# Patient Record
Sex: MALE | Race: WHITE | URBAN - METROPOLITAN AREA
[De-identification: names, ages, dates, MRNs, and addresses within clinical notes are randomized per-mention and may not be internally consistent; named-entity substitution may affect disease eponyms.]

---

## 2017-10-09 ENCOUNTER — APPOINTMENT (RX ONLY)
Dept: URBAN - METROPOLITAN AREA SURGERY CENTER 8 | Facility: SURGERY CENTER | Age: 42
Setting detail: DERMATOLOGY
End: 2017-10-09

## 2017-10-09 VITALS — HEIGHT: 71 IN | WEIGHT: 170 LBS

## 2017-10-09 DIAGNOSIS — B07.8 OTHER VIRAL WARTS: ICD-10-CM

## 2017-10-09 PROCEDURE — 99202 OFFICE O/P NEW SF 15 MIN: CPT

## 2017-10-09 PROCEDURE — ? TREATMENT REGIMEN

## 2017-10-09 PROCEDURE — ? COUNSELING

## 2017-10-09 ASSESSMENT — LOCATION DETAILED DESCRIPTION DERM
LOCATION DETAILED: LEFT DISTAL PALMAR INDEX FINGER
LOCATION DETAILED: LEFT MIDDLE FINGERTIP
LOCATION DETAILED: LEFT PROXIMAL PALMAR MIDDLE FINGER

## 2017-10-09 ASSESSMENT — LOCATION SIMPLE DESCRIPTION DERM
LOCATION SIMPLE: LEFT MIDDLE FINGER
LOCATION SIMPLE: LEFT INDEX FINGER

## 2017-10-09 ASSESSMENT — LOCATION ZONE DERM: LOCATION ZONE: FINGER

## 2017-10-09 NOTE — PROCEDURE: TREATMENT REGIMEN
Otc Regimen: Duoderm under salicylic acid or duct tape with occlusion between liquid nitrogen treatments
Detail Level: Zone

## 2017-10-09 NOTE — HPI: WARTS ([email or web address])
How Severe Are Your Warts?: mild
Is This A New Presentation, Or A Follow-Up?: Warts
Treatment Number (Optional): 10